# Patient Record
Sex: MALE | Race: WHITE | NOT HISPANIC OR LATINO
[De-identification: names, ages, dates, MRNs, and addresses within clinical notes are randomized per-mention and may not be internally consistent; named-entity substitution may affect disease eponyms.]

---
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
## 2023-01-02 ENCOUNTER — APPOINTMENT (OUTPATIENT)
Dept: MRI IMAGING | Facility: CLINIC | Age: 18
End: 2023-01-02
Payer: COMMERCIAL

## 2023-01-02 PROCEDURE — A9585: CPT

## 2023-01-02 PROCEDURE — 70553 MRI BRAIN STEM W/O & W/DYE: CPT

## 2023-01-06 ENCOUNTER — APPOINTMENT (OUTPATIENT)
Dept: NEUROSURGERY | Facility: CLINIC | Age: 18
End: 2023-01-06
Payer: COMMERCIAL

## 2023-01-06 VITALS
HEART RATE: 58 BPM | WEIGHT: 155 LBS | DIASTOLIC BLOOD PRESSURE: 79 MMHG | OXYGEN SATURATION: 98 % | BODY MASS INDEX: 24.33 KG/M2 | HEIGHT: 67 IN | SYSTOLIC BLOOD PRESSURE: 127 MMHG

## 2023-01-06 PROCEDURE — 99214 OFFICE O/P EST MOD 30 MIN: CPT

## 2023-01-17 NOTE — PHYSICAL EXAM
[General Appearance - Alert] : alert [General Appearance - In No Acute Distress] : in no acute distress [General Appearance - Well Nourished] : well nourished [General Appearance - Well-Appearing] : healthy appearing [Oriented To Time, Place, And Person] : oriented to person, place, and time [Impaired Insight] : insight and judgment were intact [Affect] : the affect was normal [Memory Recent] : recent memory was not impaired [Person] : oriented to person [Place] : oriented to place [Time] : oriented to time [Short Term Intact] : short term memory intact [Cranial Nerves Optic (II)] : visual acuity intact bilaterally,  pupils equal round and reactive to light [Cranial Nerves Oculomotor (III)] : extraocular motion intact [Cranial Nerves Trigeminal (V)] : facial sensation intact symmetrically [Cranial Nerves Facial (VII)] : face symmetrical [Cranial Nerves Vestibulocochlear (VIII)] : hearing was intact bilaterally [Cranial Nerves Accessory (XI - Cranial And Spinal)] : head turning and shoulder shrug symmetric [Cranial Nerves Hypoglossal (XII)] : there was no tongue deviation with protrusion [Motor Tone] : muscle tone was normal in all four extremities [Motor Strength] : muscle strength was normal in all four extremities [Sensation Tactile Decrease] : light touch was intact [Sensation Pain / Temperature Decrease] : pain and temperature was intact [Balance] : balance was intact [Sclera] : the sclera and conjunctiva were normal [PERRL With Normal Accommodation] : pupils were equal in size, round, reactive to light, with normal accommodation [Outer Ear] : the ears and nose were normal in appearance [Both Tympanic Membranes Were Examined] : both tympanic membranes were normal [Neck Appearance] : the appearance of the neck was normal [Respiration, Rhythm And Depth] : normal respiratory rhythm and effort [Apical Impulse] : the apical impulse was normal [Heart Rate And Rhythm] : heart rate was normal and rhythm regular [Arterial Pulses Carotid] : carotid pulses were normal with no bruits [Abdominal Aorta] : the abdominal aorta was normal [No CVA Tenderness] : no ~M costovertebral angle tenderness [No Spinal Tenderness] : no spinal tenderness [Abnormal Walk] : normal gait [Involuntary Movements] : no involuntary movements were seen [Skin Color & Pigmentation] : normal skin color and pigmentation [] : no rash [FreeTextEntry8] : no drift, no imbalance in tandem

## 2023-01-17 NOTE — REVIEW OF SYSTEMS
[Negative] : Heme/Lymph [Arm Weakness] : no arm weakness [Leg Weakness] : no leg weakness [Numbness] : no numbness [Tingling] : no tingling

## 2023-01-17 NOTE — HISTORY OF PRESENT ILLNESS
[FreeTextEntry1] : 16- year-old male, a sophomore in college at [employer] , complaining of headache with photophobia, sound sensitivity, nausea and neck pain was seen in the ER in July 2022. Patient was away before this happened was in [facility], was not aware of any tick bite. MRI Head showed an 8 mm Pineal cyst. Pt continued to c/o vertigo and occasionally feel blurry vision. Pt was referred for ophthalmology consult for blurred vision. \par \par Pt back with no specific symptoms.Pt denies headache, dizziness, balance issue and blurred vision.  Pt doing well , back to his routines. He does walking / gym with no issues. Pt on winter break, going back to college in the next week. Pt completed an other scan with contrast and here for further discussion. \par \par

## 2023-01-17 NOTE — ASSESSMENT
[FreeTextEntry1] : IMPRESSION:\par \par 16- year-old male complaining of headache with photophobia, sound sensitivity, nausea and neck pain was seen in the ER in July 2022. MRI Head showed a 12 x 8 mm Pineal cyst. Pt with no neurological symptoms , his blurred vision/ dizziness fully  resolved. MRI Head with contrast with stable cyst.  Cyst to be watched, no intervention needed at this time. \par \par PLAN:\par \par MRI Head w+ w/o contrast  in 1 year  from the previous imaging.\par F/U after the imaging in January 2024.

## 2023-01-17 NOTE — RESULTS/DATA
[FreeTextEntry1] : EXAM: [id number] - MR BRAIN WAW IC - ORDERED BY: YANCI ROCHA\par \par \par PROCEDURE DATE: 01/02/2023\par \par \par \par INTERPRETATION: CLINICAL INDICATION: Dizziness.\par \par TECHNIQUE: Multi-planar, multi-sequence magnetic resonance imaging of the brain was performed with and without intravenous contrast.\par \par CONTRAST: Post-contrast MR images were obtained following infusion of 7.5 mL of Gadavist\par \par COMPARISON: No prior studies are available for comparison\par \par FINDINGS: There is no acute infarct. There is no evidence of intracranial hemorrhage. There is no abnormal enhancement within the brain. Ventricles and sulci are normal in size and configuration for the patient's stated age. Stable 1.2 x 0.9 centimeter popliteal [pineal] cyst. No midline shift or other significant mass effect is noted. Gray-white differentiation is maintained throughout. Normal flow voids are maintained in the dominant arterial and venous structures.\par \par Retention cyst or polyp in the right maxillary sinus, left maxillary sinus and left sphenoid sinus. The tympanomastoid spaces are clear. Orbits and orbital contents are unremarkable.\par \par IMPRESSION: NO EVIDENCE OF INTRACRANIAL HEMORRHAGE, ACUTE TERRITORIAL INFARCT OR AREA OF ABNORMAL ENHANCEMENT.\par \par STABLE 1.2 X 0.9 CENTIMETER POPLITEAL [Pineal] CYST.\par \par

## 2023-04-13 ENCOUNTER — NON-APPOINTMENT (OUTPATIENT)
Age: 18
End: 2023-04-13

## 2023-06-05 ENCOUNTER — APPOINTMENT (OUTPATIENT)
Dept: NEUROSURGERY | Facility: CLINIC | Age: 18
End: 2023-06-05

## 2024-06-13 ENCOUNTER — NON-APPOINTMENT (OUTPATIENT)
Age: 19
End: 2024-06-13

## 2024-06-14 ENCOUNTER — NON-APPOINTMENT (OUTPATIENT)
Age: 19
End: 2024-06-14

## 2024-06-14 ENCOUNTER — APPOINTMENT (OUTPATIENT)
Dept: NEUROSURGERY | Facility: CLINIC | Age: 19
End: 2024-06-14
Payer: COMMERCIAL

## 2024-06-14 VITALS
BODY MASS INDEX: 24.33 KG/M2 | HEIGHT: 67 IN | SYSTOLIC BLOOD PRESSURE: 118 MMHG | DIASTOLIC BLOOD PRESSURE: 75 MMHG | OXYGEN SATURATION: 100 % | WEIGHT: 155 LBS | HEART RATE: 82 BPM

## 2024-06-14 PROCEDURE — 99214 OFFICE O/P EST MOD 30 MIN: CPT

## 2024-06-28 NOTE — ASSESSMENT
[FreeTextEntry1] : IMPRESSION:  18- year-old male, a sophomore in college at [employer] with pineal cyst was  complaining of headache, photophobia, and blurry vision. MRI Head on 1/2023 showed 8 mm Pineal cyst stable. Pt had another MRI in R in 5/2024 with stable finding of pineal cyst, but with enlarged cysts in maxillary and sphenoid sinuses. He is scheduled for left knee surgery by ortho on 6/19/24.   PLAN: No contraindication from neurosurgical perspective F/U with ENT for sinus finding - report printed and given F/U in 2 years with repeat scan, pt will call 3 months prior to May 2026

## 2024-06-28 NOTE — RESULTS/DATA
[FreeTextEntry1] : [facility] 5/22/24 	 EXAM:  MRI BRAIN WITHOUT AND WITH CONTRAST  HISTORY:  Follow-up pineal cyst visualized on outside brain MRI.  TECHNIQUE:  Magnetic resonance imaging of the brain was performed with and without intravenous contrast utilizing sagittal, axial and coronal T1-weighted images,  postcontrast axial and coronal T1-weighted images gradient echo axial images, FLAIR axial images and fast spin-echo T2-weighted axial images on a 1.5T MR unit.   IV Contrast:  15 ml of Clariscan was injected from a 15 ml single use vial.  COMPARISON:  Outside brain MRI dated 1/2/2023.  FINDINGS:   The sulci and ventricular volumes are appropriate for age.  There is no area of restricted diffusion to indicate acute-subacute infarct. There is no evidence for acute intracranial hemorrhage. There is no mass effect or midline shift.  There is no abnormal brain parenchymal signal. There is no abnormal intracranial enhancement.  There is no extra-axial fluid collection.  There is redemonstration of a benign simple nonenhancing pineal cyst measuring 1.0 x 1.3 x 0.7 cm (TR x AP x CC), which results in abutment upon the tectal plate of the midbrain without associated brain parenchymal edema or hydrocephalus.  The visual orbits are within normal limits.  The major intracranial arteries are patent.  The calvarium demonstrates normal signal intensity, without abnormal enhancement.  Mild increase in size of large 4.0 cm right maxillary sinus floor mucus retention cyst resulting in near-complete opacification of the right maxillary sinus. Increase in number and size of a few small left maxillary sinus floor mucus retention cyst. Furthermore, there is mild increase in size of moderate sized 2.6 cm left sphenoid sinus floor mucus retention cyst. There is minimal mucosal thickening of the bilateral sphenoid and left maxillary sinus. There is new opacification of the bilateral anterior ethmoid air cells and left frontal recess with mucosal thickening and/or mucous retention cysts. The remaining visceral paranasal sinuses are clear.   The visualized bilateral tympanomastoid cavities are clear.  IMPRESSION:  Since brain MRI 1/2/23: 1.  Unchanged benign nonenhancing simple 1.3 cm pineal cyst. Otherwise, normal postcontrast MRI of the brain. 2.  Increase in size of large right maxillary sinus and moderate-sized left sphenoid sinus mucus retention cysts resulting in near-complete opacification of the right maxillary sinus and moderate opacification of the left sphenoid sinus. Increase in number and size of small mucous retention cysts at the floor the left maxillary sinus. New opacification of the bilateral anterior ethmoid air cells and left frontal recess with mucosal thickening and/or mucus retention cysts. Consider ENT evaluation.

## 2024-06-28 NOTE — HISTORY OF PRESENT ILLNESS
[FreeTextEntry1] : 18- year-old male, a sophomore in college at [employer] , complaining of headache with photophobia, sound sensitivity, nausea and neck pain was seen in the ER in July 2022. Patient was away before this happened was in [facility], was not aware of any tick bite. MRI Head showed an 8 mm Pineal cyst. Pt was seen in the clinic with repeat scan which was stable.   Pt returns with no specific symptoms. Pt denies headache, dizziness, balance issue and blurred vision.  Pt doing well, in the summer break. He does walk and doing gym with no issues. Pt completed another scan in 5/2024 with contrast and here for further discussion. Pt suffers with toned lateral meniscus on left knee, on the process of surgical repair by orthopedic, plans to do in the next week.